# Patient Record
Sex: MALE | Race: WHITE | Employment: OTHER | URBAN - METROPOLITAN AREA
[De-identification: names, ages, dates, MRNs, and addresses within clinical notes are randomized per-mention and may not be internally consistent; named-entity substitution may affect disease eponyms.]

---

## 2018-02-12 ENCOUNTER — HOSPITAL ENCOUNTER (OUTPATIENT)
Age: 78
Discharge: HOME | End: 2018-02-12
Payer: MEDICARE

## 2018-12-04 ENCOUNTER — ANESTHESIA EVENT (OUTPATIENT)
Dept: OPERATING ROOM | Age: 78
End: 2018-12-04
Payer: MEDICARE

## 2018-12-04 ENCOUNTER — ANESTHESIA (OUTPATIENT)
Dept: OPERATING ROOM | Age: 78
End: 2018-12-04
Payer: MEDICARE

## 2018-12-04 ENCOUNTER — HOSPITAL ENCOUNTER (OUTPATIENT)
Age: 78
Setting detail: OUTPATIENT SURGERY
Discharge: HOME | End: 2018-12-04
Attending: NEUROLOGICAL SURGERY | Admitting: NEUROLOGICAL SURGERY
Payer: MEDICARE

## 2018-12-04 VITALS
SYSTOLIC BLOOD PRESSURE: 127 MMHG | DIASTOLIC BLOOD PRESSURE: 64 MMHG | RESPIRATION RATE: 16 BRPM | HEIGHT: 72 IN | BODY MASS INDEX: 28.58 KG/M2 | OXYGEN SATURATION: 95 % | TEMPERATURE: 97.3 F | WEIGHT: 211 LBS | HEART RATE: 68 BPM

## 2018-12-04 VITALS — DIASTOLIC BLOOD PRESSURE: 63 MMHG | OXYGEN SATURATION: 96 % | SYSTOLIC BLOOD PRESSURE: 124 MMHG

## 2018-12-04 DIAGNOSIS — G89.18 POST-OP PAIN: Primary | ICD-10-CM

## 2018-12-04 PROCEDURE — 3700000000 HC ANESTHESIA ATTENDED CARE: Performed by: NEUROLOGICAL SURGERY

## 2018-12-04 PROCEDURE — 6370000000 HC RX 637 (ALT 250 FOR IP): Performed by: NEUROLOGICAL SURGERY

## 2018-12-04 PROCEDURE — 2500000003 HC RX 250 WO HCPCS: Performed by: NEUROLOGICAL SURGERY

## 2018-12-04 PROCEDURE — 2580000003 HC RX 258: Performed by: NEUROLOGICAL SURGERY

## 2018-12-04 PROCEDURE — 7100000011 HC PHASE II RECOVERY - ADDTL 15 MIN: Performed by: NEUROLOGICAL SURGERY

## 2018-12-04 PROCEDURE — 3600000003 HC SURGERY LEVEL 3 BASE: Performed by: NEUROLOGICAL SURGERY

## 2018-12-04 PROCEDURE — 2500000003 HC RX 250 WO HCPCS: Performed by: ANESTHESIOLOGY

## 2018-12-04 PROCEDURE — 6360000002 HC RX W HCPCS: Performed by: NEUROLOGICAL SURGERY

## 2018-12-04 PROCEDURE — 2709999900 HC NON-CHARGEABLE SUPPLY: Performed by: NEUROLOGICAL SURGERY

## 2018-12-04 PROCEDURE — 2580000003 HC RX 258: Performed by: ANESTHESIOLOGY

## 2018-12-04 PROCEDURE — 6360000002 HC RX W HCPCS: Performed by: NURSE ANESTHETIST, CERTIFIED REGISTERED

## 2018-12-04 PROCEDURE — 3700000001 HC ADD 15 MINUTES (ANESTHESIA): Performed by: NEUROLOGICAL SURGERY

## 2018-12-04 PROCEDURE — 3600000013 HC SURGERY LEVEL 3 ADDTL 15MIN: Performed by: NEUROLOGICAL SURGERY

## 2018-12-04 PROCEDURE — 2500000003 HC RX 250 WO HCPCS: Performed by: NURSE ANESTHETIST, CERTIFIED REGISTERED

## 2018-12-04 PROCEDURE — 7100000010 HC PHASE II RECOVERY - FIRST 15 MIN: Performed by: NEUROLOGICAL SURGERY

## 2018-12-04 RX ORDER — CEFUROXIME AXETIL 500 MG/1
500 TABLET ORAL 2 TIMES DAILY
Refills: 0 | COMMUNITY
Start: 2018-11-12 | End: 2019-01-07

## 2018-12-04 RX ORDER — ROSUVASTATIN CALCIUM 20 MG/1
20 TABLET, COATED ORAL DAILY
COMMUNITY

## 2018-12-04 RX ORDER — LOSARTAN POTASSIUM 100 MG/1
50 TABLET ORAL DAILY
COMMUNITY
Start: 2016-03-15

## 2018-12-04 RX ORDER — DIPHENHYDRAMINE HYDROCHLORIDE 50 MG/ML
12.5 INJECTION INTRAMUSCULAR; INTRAVENOUS
Status: DISCONTINUED | OUTPATIENT
Start: 2018-12-04 | End: 2018-12-04 | Stop reason: HOSPADM

## 2018-12-04 RX ORDER — HYDROCODONE BITARTRATE AND ACETAMINOPHEN 5; 325 MG/1; MG/1
2 TABLET ORAL PRN
Status: DISCONTINUED | OUTPATIENT
Start: 2018-12-04 | End: 2018-12-04 | Stop reason: HOSPADM

## 2018-12-04 RX ORDER — TRAMADOL HYDROCHLORIDE 50 MG/1
50 TABLET ORAL 2 TIMES DAILY PRN
Status: ON HOLD | COMMUNITY
End: 2018-12-04 | Stop reason: HOSPADM

## 2018-12-04 RX ORDER — ONDANSETRON 2 MG/ML
4 INJECTION INTRAMUSCULAR; INTRAVENOUS
Status: DISCONTINUED | OUTPATIENT
Start: 2018-12-04 | End: 2018-12-04 | Stop reason: HOSPADM

## 2018-12-04 RX ORDER — GINSENG 100 MG
CAPSULE ORAL PRN
Status: DISCONTINUED | OUTPATIENT
Start: 2018-12-04 | End: 2018-12-04 | Stop reason: HOSPADM

## 2018-12-04 RX ORDER — LIDOCAINE HYDROCHLORIDE 20 MG/ML
INJECTION, SOLUTION INFILTRATION; PERINEURAL PRN
Status: DISCONTINUED | OUTPATIENT
Start: 2018-12-04 | End: 2018-12-04 | Stop reason: SDUPTHER

## 2018-12-04 RX ORDER — HYDROCODONE BITARTRATE AND ACETAMINOPHEN 5; 325 MG/1; MG/1
1 TABLET ORAL PRN
Status: DISCONTINUED | OUTPATIENT
Start: 2018-12-04 | End: 2018-12-04 | Stop reason: HOSPADM

## 2018-12-04 RX ORDER — SODIUM CHLORIDE, SODIUM LACTATE, POTASSIUM CHLORIDE, CALCIUM CHLORIDE 600; 310; 30; 20 MG/100ML; MG/100ML; MG/100ML; MG/100ML
INJECTION, SOLUTION INTRAVENOUS CONTINUOUS
Status: DISCONTINUED | OUTPATIENT
Start: 2018-12-04 | End: 2018-12-04 | Stop reason: HOSPADM

## 2018-12-04 RX ORDER — CEFAZOLIN SODIUM 2 G/50ML
2 SOLUTION INTRAVENOUS
Status: COMPLETED | OUTPATIENT
Start: 2018-12-04 | End: 2018-12-04

## 2018-12-04 RX ORDER — LIDOCAINE HYDROCHLORIDE 10 MG/ML
INJECTION, SOLUTION EPIDURAL; INFILTRATION; INTRACAUDAL; PERINEURAL PRN
Status: DISCONTINUED | OUTPATIENT
Start: 2018-12-04 | End: 2018-12-04 | Stop reason: HOSPADM

## 2018-12-04 RX ORDER — LIDOCAINE HYDROCHLORIDE 10 MG/ML
1 INJECTION, SOLUTION EPIDURAL; INFILTRATION; INTRACAUDAL; PERINEURAL
Status: COMPLETED | OUTPATIENT
Start: 2018-12-04 | End: 2018-12-04

## 2018-12-04 RX ORDER — PROPOFOL 10 MG/ML
INJECTION, EMULSION INTRAVENOUS CONTINUOUS PRN
Status: DISCONTINUED | OUTPATIENT
Start: 2018-12-04 | End: 2018-12-04 | Stop reason: SDUPTHER

## 2018-12-04 RX ORDER — FENTANYL CITRATE 50 UG/ML
50 INJECTION, SOLUTION INTRAMUSCULAR; INTRAVENOUS EVERY 10 MIN PRN
Status: DISCONTINUED | OUTPATIENT
Start: 2018-12-04 | End: 2018-12-04 | Stop reason: HOSPADM

## 2018-12-04 RX ORDER — MEPERIDINE HYDROCHLORIDE 25 MG/ML
12.5 INJECTION INTRAMUSCULAR; INTRAVENOUS; SUBCUTANEOUS EVERY 5 MIN PRN
Status: DISCONTINUED | OUTPATIENT
Start: 2018-12-04 | End: 2018-12-04 | Stop reason: HOSPADM

## 2018-12-04 RX ORDER — METOCLOPRAMIDE HYDROCHLORIDE 5 MG/ML
10 INJECTION INTRAMUSCULAR; INTRAVENOUS
Status: DISCONTINUED | OUTPATIENT
Start: 2018-12-04 | End: 2018-12-04 | Stop reason: HOSPADM

## 2018-12-04 RX ORDER — MAGNESIUM HYDROXIDE 1200 MG/15ML
LIQUID ORAL CONTINUOUS PRN
Status: DISCONTINUED | OUTPATIENT
Start: 2018-12-04 | End: 2018-12-04 | Stop reason: HOSPADM

## 2018-12-04 RX ORDER — PROPOFOL 10 MG/ML
INJECTION, EMULSION INTRAVENOUS PRN
Status: DISCONTINUED | OUTPATIENT
Start: 2018-12-04 | End: 2018-12-04 | Stop reason: SDUPTHER

## 2018-12-04 RX ORDER — CEPHALEXIN 500 MG/1
500 CAPSULE ORAL 3 TIMES DAILY
Qty: 21 CAPSULE | Refills: 0 | Status: SHIPPED | OUTPATIENT
Start: 2018-12-04 | End: 2018-12-11

## 2018-12-04 RX ORDER — OXYCODONE HYDROCHLORIDE AND ACETAMINOPHEN 5; 325 MG/1; MG/1
1 TABLET ORAL EVERY 6 HOURS PRN
Qty: 40 TABLET | Refills: 0 | Status: SHIPPED | OUTPATIENT
Start: 2018-12-04 | End: 2018-12-18

## 2018-12-04 RX ADMIN — LIDOCAINE HYDROCHLORIDE 0.1 ML: 10 INJECTION, SOLUTION EPIDURAL; INFILTRATION; INTRACAUDAL; PERINEURAL at 12:39

## 2018-12-04 RX ADMIN — SODIUM CHLORIDE, POTASSIUM CHLORIDE, SODIUM LACTATE AND CALCIUM CHLORIDE: 600; 310; 30; 20 INJECTION, SOLUTION INTRAVENOUS at 12:40

## 2018-12-04 RX ADMIN — CEFAZOLIN SODIUM 2 G: 2 SOLUTION INTRAVENOUS at 14:45

## 2018-12-04 RX ADMIN — PROPOFOL 140 MCG/KG/MIN: 10 INJECTION, EMULSION INTRAVENOUS at 14:42

## 2018-12-04 RX ADMIN — PROPOFOL 50 MG: 10 INJECTION, EMULSION INTRAVENOUS at 14:45

## 2018-12-04 RX ADMIN — PROPOFOL 50 MG: 10 INJECTION, EMULSION INTRAVENOUS at 14:42

## 2018-12-04 RX ADMIN — LIDOCAINE HYDROCHLORIDE 60 MG: 20 INJECTION, SOLUTION INFILTRATION; PERINEURAL at 14:42

## 2018-12-04 ASSESSMENT — PULMONARY FUNCTION TESTS
PIF_VALUE: 0

## 2018-12-04 ASSESSMENT — PAIN - FUNCTIONAL ASSESSMENT: PAIN_FUNCTIONAL_ASSESSMENT: 0-10

## 2019-01-07 ENCOUNTER — HOSPITAL ENCOUNTER (OUTPATIENT)
Dept: PREADMISSION TESTING | Age: 79
Discharge: HOME | End: 2019-01-11
Payer: MEDICARE

## 2019-01-07 VITALS
RESPIRATION RATE: 16 BRPM | HEART RATE: 80 BPM | WEIGHT: 217.4 LBS | DIASTOLIC BLOOD PRESSURE: 79 MMHG | SYSTOLIC BLOOD PRESSURE: 129 MMHG | HEIGHT: 72 IN | TEMPERATURE: 97.6 F | OXYGEN SATURATION: 98 % | BODY MASS INDEX: 29.45 KG/M2

## 2019-01-07 DIAGNOSIS — G56.02 CARPAL TUNNEL SYNDROME ON LEFT: ICD-10-CM

## 2019-01-07 DIAGNOSIS — Z92.3 HX OF RADIATION THERAPY: Chronic | ICD-10-CM

## 2019-01-07 DIAGNOSIS — I25.2 MI, OLD: Chronic | ICD-10-CM

## 2019-01-07 PROBLEM — I10 HTN (HYPERTENSION): Chronic | Status: ACTIVE | Noted: 2019-01-07

## 2019-01-07 PROBLEM — A41.9 SEPSIS (HCC): Chronic | Status: ACTIVE | Noted: 2019-01-07

## 2019-01-07 PROBLEM — N40.0 BPH (BENIGN PROSTATIC HYPERPLASIA): Chronic | Status: ACTIVE | Noted: 2019-01-07

## 2019-01-07 LAB
ANION GAP SERPL CALCULATED.3IONS-SCNC: 14 MEQ/L (ref 7–13)
BUN BLDV-MCNC: 25 MG/DL (ref 8–23)
CALCIUM SERPL-MCNC: 10.4 MG/DL (ref 8.6–10.2)
CHLORIDE BLD-SCNC: 105 MEQ/L (ref 98–107)
CO2: 26 MEQ/L (ref 22–29)
CREAT SERPL-MCNC: 1.15 MG/DL (ref 0.7–1.2)
EKG ATRIAL RATE: 73 BPM
EKG P AXIS: 1 DEGREES
EKG P-R INTERVAL: 278 MS
EKG Q-T INTERVAL: 416 MS
EKG QRS DURATION: 104 MS
EKG QTC CALCULATION (BAZETT): 458 MS
EKG R AXIS: 19 DEGREES
EKG T AXIS: 26 DEGREES
EKG VENTRICULAR RATE: 73 BPM
GFR AFRICAN AMERICAN: >60
GFR NON-AFRICAN AMERICAN: >60
GLUCOSE BLD-MCNC: 95 MG/DL (ref 74–109)
HCT VFR BLD CALC: 43.2 % (ref 42–52)
HEMOGLOBIN: 15.1 G/DL (ref 14–18)
INR BLD: 1
MCH RBC QN AUTO: 32.7 PG (ref 27–31.3)
MCHC RBC AUTO-ENTMCNC: 34.9 % (ref 33–37)
MCV RBC AUTO: 93.6 FL (ref 80–100)
PDW BLD-RTO: 13.8 % (ref 11.5–14.5)
PLATELET # BLD: 188 K/UL (ref 130–400)
POTASSIUM SERPL-SCNC: 4.1 MEQ/L (ref 3.5–5.1)
PROTHROMBIN TIME: 10 SEC (ref 9–11.5)
RBC # BLD: 4.61 M/UL (ref 4.7–6.1)
SODIUM BLD-SCNC: 145 MEQ/L (ref 132–144)
TSH SERPL DL<=0.05 MIU/L-ACNC: 0.18 UIU/ML (ref 0.27–4.2)
WBC # BLD: 8.3 K/UL (ref 4.8–10.8)

## 2019-01-07 PROCEDURE — 93010 ELECTROCARDIOGRAM REPORT: CPT | Performed by: INTERNAL MEDICINE

## 2019-01-07 PROCEDURE — 80048 BASIC METABOLIC PNL TOTAL CA: CPT

## 2019-01-07 PROCEDURE — 85027 COMPLETE CBC AUTOMATED: CPT

## 2019-01-07 PROCEDURE — 93005 ELECTROCARDIOGRAM TRACING: CPT

## 2019-01-07 PROCEDURE — 85610 PROTHROMBIN TIME: CPT

## 2019-01-07 PROCEDURE — 84443 ASSAY THYROID STIM HORMONE: CPT

## 2019-01-07 RX ORDER — SODIUM CHLORIDE 0.9 % (FLUSH) 0.9 %
10 SYRINGE (ML) INJECTION PRN
Status: CANCELLED | OUTPATIENT
Start: 2019-01-10

## 2019-01-07 RX ORDER — HYDROCHLOROTHIAZIDE 12.5 MG/1
12.5 CAPSULE, GELATIN COATED ORAL DAILY
COMMUNITY

## 2019-01-07 RX ORDER — SODIUM CHLORIDE 0.9 % (FLUSH) 0.9 %
10 SYRINGE (ML) INJECTION EVERY 12 HOURS SCHEDULED
Status: CANCELLED | OUTPATIENT
Start: 2019-01-10

## 2019-01-07 RX ORDER — CEFAZOLIN SODIUM 2 G/50ML
2 SOLUTION INTRAVENOUS ONCE
Status: CANCELLED | OUTPATIENT
Start: 2019-01-10

## 2019-01-07 RX ORDER — LIDOCAINE HYDROCHLORIDE 10 MG/ML
1 INJECTION, SOLUTION EPIDURAL; INFILTRATION; INTRACAUDAL; PERINEURAL
Status: CANCELLED | OUTPATIENT
Start: 2019-01-10 | End: 2019-01-10

## 2019-01-07 RX ORDER — SODIUM CHLORIDE, SODIUM LACTATE, POTASSIUM CHLORIDE, CALCIUM CHLORIDE 600; 310; 30; 20 MG/100ML; MG/100ML; MG/100ML; MG/100ML
INJECTION, SOLUTION INTRAVENOUS CONTINUOUS
Status: CANCELLED | OUTPATIENT
Start: 2019-01-10

## 2019-01-07 ASSESSMENT — ENCOUNTER SYMPTOMS
COUGH: 0
STRIDOR: 0
CONSTIPATION: 0
ABDOMINAL PAIN: 0
CHEST TIGHTNESS: 0
WHEEZING: 0
DIARRHEA: 0
SHORTNESS OF BREATH: 0
TROUBLE SWALLOWING: 0
EYES NEGATIVE: 1
NAUSEA: 0
BACK PAIN: 1
VOMITING: 0
ALLERGIC/IMMUNOLOGIC NEGATIVE: 1
SORE THROAT: 0

## 2019-01-10 ENCOUNTER — ANESTHESIA EVENT (OUTPATIENT)
Dept: OPERATING ROOM | Age: 79
End: 2019-01-10
Payer: MEDICARE

## 2019-01-10 ENCOUNTER — ANESTHESIA (OUTPATIENT)
Dept: OPERATING ROOM | Age: 79
End: 2019-01-10
Payer: MEDICARE

## 2019-01-10 ENCOUNTER — HOSPITAL ENCOUNTER (OUTPATIENT)
Age: 79
Setting detail: OUTPATIENT SURGERY
Discharge: HOME | End: 2019-01-10
Attending: NEUROLOGICAL SURGERY | Admitting: NEUROLOGICAL SURGERY
Payer: MEDICARE

## 2019-01-10 VITALS
WEIGHT: 217 LBS | OXYGEN SATURATION: 98 % | DIASTOLIC BLOOD PRESSURE: 68 MMHG | HEIGHT: 72 IN | TEMPERATURE: 97.5 F | SYSTOLIC BLOOD PRESSURE: 155 MMHG | BODY MASS INDEX: 29.39 KG/M2 | HEART RATE: 59 BPM | RESPIRATION RATE: 16 BRPM

## 2019-01-10 VITALS — SYSTOLIC BLOOD PRESSURE: 111 MMHG | DIASTOLIC BLOOD PRESSURE: 55 MMHG | OXYGEN SATURATION: 97 %

## 2019-01-10 DIAGNOSIS — G89.18 POST-OP PAIN: Primary | ICD-10-CM

## 2019-01-10 PROCEDURE — 2709999900 HC NON-CHARGEABLE SUPPLY: Performed by: NEUROLOGICAL SURGERY

## 2019-01-10 PROCEDURE — 2500000003 HC RX 250 WO HCPCS: Performed by: NURSE ANESTHETIST, CERTIFIED REGISTERED

## 2019-01-10 PROCEDURE — 2580000003 HC RX 258: Performed by: NURSE PRACTITIONER

## 2019-01-10 PROCEDURE — 3700000000 HC ANESTHESIA ATTENDED CARE: Performed by: NEUROLOGICAL SURGERY

## 2019-01-10 PROCEDURE — 7100000011 HC PHASE II RECOVERY - ADDTL 15 MIN: Performed by: NEUROLOGICAL SURGERY

## 2019-01-10 PROCEDURE — 2500000003 HC RX 250 WO HCPCS: Performed by: NEUROLOGICAL SURGERY

## 2019-01-10 PROCEDURE — 2500000003 HC RX 250 WO HCPCS: Performed by: NURSE PRACTITIONER

## 2019-01-10 PROCEDURE — 7100000010 HC PHASE II RECOVERY - FIRST 15 MIN: Performed by: NEUROLOGICAL SURGERY

## 2019-01-10 PROCEDURE — 6360000002 HC RX W HCPCS: Performed by: NURSE ANESTHETIST, CERTIFIED REGISTERED

## 2019-01-10 PROCEDURE — 6370000000 HC RX 637 (ALT 250 FOR IP): Performed by: NEUROLOGICAL SURGERY

## 2019-01-10 PROCEDURE — 2580000003 HC RX 258: Performed by: NEUROLOGICAL SURGERY

## 2019-01-10 PROCEDURE — 6370000000 HC RX 637 (ALT 250 FOR IP): Performed by: STUDENT IN AN ORGANIZED HEALTH CARE EDUCATION/TRAINING PROGRAM

## 2019-01-10 PROCEDURE — 6360000002 HC RX W HCPCS: Performed by: NURSE PRACTITIONER

## 2019-01-10 PROCEDURE — 3700000001 HC ADD 15 MINUTES (ANESTHESIA): Performed by: NEUROLOGICAL SURGERY

## 2019-01-10 PROCEDURE — 3600000013 HC SURGERY LEVEL 3 ADDTL 15MIN: Performed by: NEUROLOGICAL SURGERY

## 2019-01-10 PROCEDURE — 3600000003 HC SURGERY LEVEL 3 BASE: Performed by: NEUROLOGICAL SURGERY

## 2019-01-10 RX ORDER — CEFAZOLIN SODIUM 2 G/50ML
2 SOLUTION INTRAVENOUS ONCE
Status: COMPLETED | OUTPATIENT
Start: 2019-01-10 | End: 2019-01-10

## 2019-01-10 RX ORDER — PROPOFOL 10 MG/ML
INJECTION, EMULSION INTRAVENOUS PRN
Status: DISCONTINUED | OUTPATIENT
Start: 2019-01-10 | End: 2019-01-10 | Stop reason: SDUPTHER

## 2019-01-10 RX ORDER — SODIUM CHLORIDE, SODIUM LACTATE, POTASSIUM CHLORIDE, CALCIUM CHLORIDE 600; 310; 30; 20 MG/100ML; MG/100ML; MG/100ML; MG/100ML
INJECTION, SOLUTION INTRAVENOUS CONTINUOUS
Status: DISCONTINUED | OUTPATIENT
Start: 2019-01-10 | End: 2019-01-10 | Stop reason: HOSPADM

## 2019-01-10 RX ORDER — DIPHENHYDRAMINE HYDROCHLORIDE 50 MG/ML
12.5 INJECTION INTRAMUSCULAR; INTRAVENOUS
Status: DISCONTINUED | OUTPATIENT
Start: 2019-01-10 | End: 2019-01-10 | Stop reason: HOSPADM

## 2019-01-10 RX ORDER — HYDROCODONE BITARTRATE AND ACETAMINOPHEN 5; 325 MG/1; MG/1
2 TABLET ORAL PRN
Status: COMPLETED | OUTPATIENT
Start: 2019-01-10 | End: 2019-01-10

## 2019-01-10 RX ORDER — FENTANYL CITRATE 50 UG/ML
50 INJECTION, SOLUTION INTRAMUSCULAR; INTRAVENOUS EVERY 10 MIN PRN
Status: DISCONTINUED | OUTPATIENT
Start: 2019-01-10 | End: 2019-01-10 | Stop reason: HOSPADM

## 2019-01-10 RX ORDER — LIDOCAINE HYDROCHLORIDE 20 MG/ML
INJECTION, SOLUTION INFILTRATION; PERINEURAL PRN
Status: DISCONTINUED | OUTPATIENT
Start: 2019-01-10 | End: 2019-01-10 | Stop reason: SDUPTHER

## 2019-01-10 RX ORDER — GINSENG 100 MG
CAPSULE ORAL PRN
Status: DISCONTINUED | OUTPATIENT
Start: 2019-01-10 | End: 2019-01-10 | Stop reason: HOSPADM

## 2019-01-10 RX ORDER — MAGNESIUM HYDROXIDE 1200 MG/15ML
LIQUID ORAL CONTINUOUS PRN
Status: DISCONTINUED | OUTPATIENT
Start: 2019-01-10 | End: 2019-01-10 | Stop reason: HOSPADM

## 2019-01-10 RX ORDER — OXYCODONE HYDROCHLORIDE AND ACETAMINOPHEN 5; 325 MG/1; MG/1
1 TABLET ORAL EVERY 6 HOURS PRN
Qty: 28 TABLET | Refills: 0 | Status: SHIPPED | OUTPATIENT
Start: 2019-01-10 | End: 2019-01-17

## 2019-01-10 RX ORDER — ONDANSETRON 2 MG/ML
4 INJECTION INTRAMUSCULAR; INTRAVENOUS
Status: DISCONTINUED | OUTPATIENT
Start: 2019-01-10 | End: 2019-01-10 | Stop reason: HOSPADM

## 2019-01-10 RX ORDER — LIDOCAINE HYDROCHLORIDE 10 MG/ML
1 INJECTION, SOLUTION EPIDURAL; INFILTRATION; INTRACAUDAL; PERINEURAL
Status: COMPLETED | OUTPATIENT
Start: 2019-01-10 | End: 2019-01-10

## 2019-01-10 RX ORDER — CEPHALEXIN 500 MG/1
500 CAPSULE ORAL 3 TIMES DAILY
Qty: 21 CAPSULE | Refills: 0 | Status: SHIPPED | OUTPATIENT
Start: 2019-01-10 | End: 2019-01-17

## 2019-01-10 RX ORDER — MEPERIDINE HYDROCHLORIDE 25 MG/ML
12.5 INJECTION INTRAMUSCULAR; INTRAVENOUS; SUBCUTANEOUS EVERY 5 MIN PRN
Status: DISCONTINUED | OUTPATIENT
Start: 2019-01-10 | End: 2019-01-10 | Stop reason: HOSPADM

## 2019-01-10 RX ORDER — HYDROCODONE BITARTRATE AND ACETAMINOPHEN 5; 325 MG/1; MG/1
1 TABLET ORAL PRN
Status: COMPLETED | OUTPATIENT
Start: 2019-01-10 | End: 2019-01-10

## 2019-01-10 RX ORDER — METOCLOPRAMIDE HYDROCHLORIDE 5 MG/ML
10 INJECTION INTRAMUSCULAR; INTRAVENOUS
Status: DISCONTINUED | OUTPATIENT
Start: 2019-01-10 | End: 2019-01-10 | Stop reason: HOSPADM

## 2019-01-10 RX ORDER — LIDOCAINE HYDROCHLORIDE 10 MG/ML
INJECTION, SOLUTION EPIDURAL; INFILTRATION; INTRACAUDAL; PERINEURAL PRN
Status: DISCONTINUED | OUTPATIENT
Start: 2019-01-10 | End: 2019-01-10 | Stop reason: HOSPADM

## 2019-01-10 RX ADMIN — PROPOFOL 50 MG: 10 INJECTION, EMULSION INTRAVENOUS at 13:17

## 2019-01-10 RX ADMIN — PROPOFOL 50 MG: 10 INJECTION, EMULSION INTRAVENOUS at 13:13

## 2019-01-10 RX ADMIN — CEFAZOLIN SODIUM 2 G: 2 SOLUTION INTRAVENOUS at 13:06

## 2019-01-10 RX ADMIN — LIDOCAINE HYDROCHLORIDE 50 ML: 20 INJECTION, SOLUTION INFILTRATION; PERINEURAL at 13:10

## 2019-01-10 RX ADMIN — PROPOFOL 20 MG: 10 INJECTION, EMULSION INTRAVENOUS at 13:27

## 2019-01-10 RX ADMIN — PROPOFOL 30 MG: 10 INJECTION, EMULSION INTRAVENOUS at 13:31

## 2019-01-10 RX ADMIN — LIDOCAINE HYDROCHLORIDE 0.1 ML: 10 INJECTION, SOLUTION EPIDURAL; INFILTRATION; INTRACAUDAL; PERINEURAL at 11:46

## 2019-01-10 RX ADMIN — PROPOFOL 20 MG: 10 INJECTION, EMULSION INTRAVENOUS at 13:21

## 2019-01-10 RX ADMIN — HYDROCODONE BITARTRATE AND ACETAMINOPHEN 1 TABLET: 5; 325 TABLET ORAL at 14:13

## 2019-01-10 RX ADMIN — PROPOFOL 30 MG: 10 INJECTION, EMULSION INTRAVENOUS at 13:24

## 2019-01-10 RX ADMIN — PROPOFOL 100 MG: 10 INJECTION, EMULSION INTRAVENOUS at 13:10

## 2019-01-10 RX ADMIN — SODIUM CHLORIDE, POTASSIUM CHLORIDE, SODIUM LACTATE AND CALCIUM CHLORIDE: 600; 310; 30; 20 INJECTION, SOLUTION INTRAVENOUS at 11:47

## 2019-01-10 ASSESSMENT — PULMONARY FUNCTION TESTS
PIF_VALUE: 1
PIF_VALUE: 0
PIF_VALUE: 0
PIF_VALUE: 1
PIF_VALUE: 0
PIF_VALUE: 0
PIF_VALUE: 1
PIF_VALUE: 1
PIF_VALUE: 0
PIF_VALUE: 1
PIF_VALUE: 0
PIF_VALUE: 0
PIF_VALUE: 1
PIF_VALUE: 0
PIF_VALUE: 1
PIF_VALUE: 0
PIF_VALUE: 0
PIF_VALUE: 1
PIF_VALUE: 0
PIF_VALUE: 1
PIF_VALUE: 1

## 2019-01-10 ASSESSMENT — PAIN - FUNCTIONAL ASSESSMENT: PAIN_FUNCTIONAL_ASSESSMENT: 0-10

## 2019-01-10 ASSESSMENT — PAIN SCALES - GENERAL: PAINLEVEL_OUTOF10: 8

## 2023-12-13 ENCOUNTER — TELEPHONE (OUTPATIENT)
Dept: CARDIOLOGY | Facility: CLINIC | Age: 83
End: 2023-12-13
Payer: MEDICARE

## 2023-12-13 DIAGNOSIS — Z79.899 HIGH RISK MEDICATION USE: ICD-10-CM

## 2023-12-13 DIAGNOSIS — I48.91 ATRIAL FIBRILLATION, UNSPECIFIED TYPE (MULTI): ICD-10-CM

## 2023-12-13 NOTE — TELEPHONE ENCOUNTER
Amiodarone testing orders sent to [facility] due in November.  PFT paper order also completed.

## 2023-12-29 PROBLEM — I43 CARDIAC AMYLOIDOSIS (MULTI): Status: ACTIVE | Noted: 2023-12-29

## 2023-12-29 PROBLEM — I10 ESSENTIAL HYPERTENSION: Status: ACTIVE | Noted: 2023-12-29

## 2023-12-29 PROBLEM — K80.20 GALL STONES: Status: ACTIVE | Noted: 2023-12-29

## 2023-12-29 PROBLEM — I26.99 PULMONARY EMBOLISM (MULTI): Status: ACTIVE | Noted: 2023-12-29

## 2023-12-29 PROBLEM — N40.0 BPH (BENIGN PROSTATIC HYPERPLASIA): Status: ACTIVE | Noted: 2023-12-29

## 2023-12-29 PROBLEM — R94.31 ABNORMAL EKG: Status: ACTIVE | Noted: 2023-12-29

## 2023-12-29 PROBLEM — E78.5 HYPERLIPIDEMIA: Status: ACTIVE | Noted: 2023-12-29

## 2023-12-29 PROBLEM — I48.91 ATRIAL FIBRILLATION (MULTI): Status: ACTIVE | Noted: 2023-12-29

## 2023-12-29 PROBLEM — I21.9 MYOCARDIAL INFARCTION (MULTI): Status: ACTIVE | Noted: 2023-12-29

## 2023-12-29 PROBLEM — E85.4 CARDIAC AMYLOIDOSIS (MULTI): Status: ACTIVE | Noted: 2023-12-29

## 2023-12-29 PROBLEM — I25.10 CORONARY ARTERY DISEASE: Status: ACTIVE | Noted: 2023-12-29

## 2023-12-29 PROBLEM — M25.473 ANKLE EDEMA: Status: ACTIVE | Noted: 2023-12-29

## 2023-12-29 PROBLEM — I50.30: Status: ACTIVE | Noted: 2023-12-29

## 2023-12-29 PROBLEM — E04.2 MULTINODULAR GOITER: Status: ACTIVE | Noted: 2023-12-29

## 2023-12-29 PROBLEM — R06.09 DYSPNEA ON EXERTION: Status: ACTIVE | Noted: 2023-12-29

## 2023-12-29 RX ORDER — FUROSEMIDE 20 MG/1
20 TABLET ORAL DAILY
COMMUNITY
Start: 2022-02-11

## 2023-12-29 RX ORDER — AMIODARONE HYDROCHLORIDE 200 MG/1
200 TABLET ORAL DAILY
COMMUNITY
Start: 2021-12-03 | End: 2024-02-16

## 2023-12-29 RX ORDER — ROSUVASTATIN CALCIUM 20 MG/1
20 TABLET, COATED ORAL NIGHTLY
COMMUNITY
Start: 2020-11-16

## 2023-12-29 RX ORDER — TRAMADOL HYDROCHLORIDE 50 MG/1
50 TABLET ORAL AS NEEDED
COMMUNITY
Start: 2021-01-27 | End: 2024-01-24 | Stop reason: ALTCHOICE

## 2023-12-29 RX ORDER — APIXABAN 5 MG/1
5 TABLET, FILM COATED ORAL 2 TIMES DAILY
COMMUNITY
Start: 2021-01-20

## 2023-12-29 RX ORDER — LEVOTHYROXINE SODIUM 150 UG/1
150 TABLET ORAL DAILY
COMMUNITY

## 2024-01-24 ENCOUNTER — OFFICE VISIT (OUTPATIENT)
Dept: CARDIOLOGY | Facility: CLINIC | Age: 84
End: 2024-01-24
Payer: MEDICARE

## 2024-01-24 VITALS
BODY MASS INDEX: 28.17 KG/M2 | HEART RATE: 71 BPM | HEIGHT: 72 IN | DIASTOLIC BLOOD PRESSURE: 60 MMHG | SYSTOLIC BLOOD PRESSURE: 124 MMHG | WEIGHT: 208 LBS

## 2024-01-24 DIAGNOSIS — Z78.9 NEVER SMOKED ANY SUBSTANCE: ICD-10-CM

## 2024-01-24 DIAGNOSIS — I48.0 PAROXYSMAL ATRIAL FIBRILLATION (MULTI): Primary | ICD-10-CM

## 2024-01-24 DIAGNOSIS — I25.10 CORONARY ARTERY DISEASE INVOLVING NATIVE CORONARY ARTERY OF NATIVE HEART WITHOUT ANGINA PECTORIS: ICD-10-CM

## 2024-01-24 DIAGNOSIS — E78.2 MIXED HYPERLIPIDEMIA: ICD-10-CM

## 2024-01-24 DIAGNOSIS — I10 ESSENTIAL HYPERTENSION: ICD-10-CM

## 2024-01-24 PROBLEM — I43 CARDIAC AMYLOIDOSIS (MULTI): Status: RESOLVED | Noted: 2023-12-29 | Resolved: 2024-01-24

## 2024-01-24 PROBLEM — E85.4 CARDIAC AMYLOIDOSIS (MULTI): Status: RESOLVED | Noted: 2023-12-29 | Resolved: 2024-01-24

## 2024-01-24 PROCEDURE — 93000 ELECTROCARDIOGRAM COMPLETE: CPT | Performed by: INTERNAL MEDICINE

## 2024-01-24 PROCEDURE — 3078F DIAST BP <80 MM HG: CPT | Performed by: INTERNAL MEDICINE

## 2024-01-24 PROCEDURE — 3074F SYST BP LT 130 MM HG: CPT | Performed by: INTERNAL MEDICINE

## 2024-01-24 PROCEDURE — 99214 OFFICE O/P EST MOD 30 MIN: CPT | Performed by: INTERNAL MEDICINE

## 2024-01-24 RX ORDER — LOSARTAN POTASSIUM 25 MG/1
25 TABLET ORAL DAILY
COMMUNITY

## 2024-01-24 NOTE — PROGRESS NOTES
Subjective   Kwabena Farley is a 83 y.o. male       Chief Complaint    Follow-up          HPI     Patient returns in follow-up of problems as noted.  In the interim he is done well.  He denies any angina or anginal cloven symptomatology.  He has rare paroxysms of atrial fibrillation.  We discussed them.  They occur about twice a week and last about an hour.  Minimal symptoms.  We discussed other options including higher dose amiodarone and/or ablation.  After discussing explaining and exploring it in great detail he chooses to continue as before without change or adjustment.    He was educated regarding other cardiac signs and symptoms to watch for.  He will call if they arise.  Review of risk factor management demonstrates good control.     EKG done in office today   Objective   Physical Exam  Constitutional:       Appearance: Normal appearance. He is normal weight.   HENT:      Nose: Nose normal.   Neck:      Vascular: No carotid bruit.   Cardiovascular:      Rate and Rhythm: Normal rate.      Pulses: Normal pulses.      Heart sounds: Normal heart sounds.   Pulmonary:      Effort: Pulmonary effort is normal.   Abdominal:      General: Bowel sounds are normal.      Palpations: Abdomen is soft.   Genitourinary:     Rectum: Normal.   Musculoskeletal:         General: Normal range of motion.      Cervical back: Normal range of motion.      Right lower leg: No edema.      Left lower leg: No edema.   Skin:     General: Skin is warm and dry.   Neurological:      General: No focal deficit present.      Mental Status: He is alert.   Psychiatric:         Mood and Affect: Mood normal.         Behavior: Behavior normal.         Thought Content: Thought content normal.         Judgment: Judgment normal.         Current Medications    Current Outpatient Medications:     amiodarone (Pacerone) 200 mg tablet, Take 1 tablet (200 mg) by mouth once daily., Disp: , Rfl:     Eliquis 5 mg tablet, Take 1 tablet (5 mg) by mouth 2 times  a day., Disp: , Rfl:     furosemide (Lasix) 20 mg tablet, Take 1 tablet (20 mg) by mouth once daily., Disp: , Rfl:     levothyroxine (Synthroid, Levoxyl) 150 mcg tablet, Take 1 tablet (150 mcg) by mouth once daily., Disp: , Rfl:     losartan (Cozaar) 25 mg tablet, Take 1 tablet (25 mg) by mouth once daily., Disp: , Rfl:     rosuvastatin (Crestor) 20 mg tablet, Take 1 tablet (20 mg) by mouth once daily at bedtime., Disp: , Rfl:                      Assessment/Plan   1. Paroxysmal atrial fibrillation (CMS/HCC)  Minimal paroxysms.  Minimally symptomatic.  Discussed other options and he wishes to continue as is    2. Mixed hyperlipidemia  Review of current treatment strategy demonstrates adequate treatment control.    3. Coronary artery disease involving native coronary artery of native heart without angina pectoris  No symptoms with paroxysms of atrial fibrillation hence we believe CAD to be stable.    4. Essential hypertension  Review of current treatment strategy demonstrates good control    5. Never smoked any substance  Noted      Scribe Attestation  By signing my name below, Pamela LANDRY LPN  , Scribe   attest that this documentation has been prepared under the direction and in the presence of Subhash Bell MD.

## 2024-01-24 NOTE — LETTER
January 24, 2024     Magdaleno Scott MD  [address]  [facility] [address]    Patient: Kwabena Farley   YOB: 1940   Date of Visit: 1/24/2024       Dear Dr. Magdaleno Scott MD:    Thank you for referring Kwabena Farley to me for evaluation. Below are my notes for this consultation.  If you have questions, please do not hesitate to call me. I look forward to following your patient along with you.       Sincerely,     Subhash Bell MD      CC: No Recipients  ______________________________________________________________________________________    Subjective   Kwabena Farley is a 83 y.o. male       Chief Complaint    Follow-up          HPI     Patient returns in follow-up of problems as noted.  In the interim he is done well.  He denies any angina or anginal cloven symptomatology.  He has rare paroxysms of atrial fibrillation.  We discussed them.  They occur about twice a week and last about an hour.  Minimal symptoms.  We discussed other options including higher dose amiodarone and/or ablation.  After discussing explaining and exploring it in great detail he chooses to continue as before without change or adjustment.    He was educated regarding other cardiac signs and symptoms to watch for.  He will call if they arise.  Review of risk factor management demonstrates good control.     EKG done in office today   Objective   Physical Exam  Constitutional:       Appearance: Normal appearance. He is normal weight.   HENT:      Nose: Nose normal.   Neck:      Vascular: No carotid bruit.   Cardiovascular:      Rate and Rhythm: Normal rate.      Pulses: Normal pulses.      Heart sounds: Normal heart sounds.   Pulmonary:      Effort: Pulmonary effort is normal.   Abdominal:      General: Bowel sounds are normal.      Palpations: Abdomen is soft.   Genitourinary:     Rectum: Normal.   Musculoskeletal:         General: Normal range of motion.      Cervical back: Normal range of motion.       Right lower leg: No edema.      Left lower leg: No edema.   Skin:     General: Skin is warm and dry.   Neurological:      General: No focal deficit present.      Mental Status: He is alert.   Psychiatric:         Mood and Affect: Mood normal.         Behavior: Behavior normal.         Thought Content: Thought content normal.         Judgment: Judgment normal.         Current Medications    Current Outpatient Medications:   •  amiodarone (Pacerone) 200 mg tablet, Take 1 tablet (200 mg) by mouth once daily., Disp: , Rfl:   •  Eliquis 5 mg tablet, Take 1 tablet (5 mg) by mouth 2 times a day., Disp: , Rfl:   •  furosemide (Lasix) 20 mg tablet, Take 1 tablet (20 mg) by mouth once daily., Disp: , Rfl:   •  levothyroxine (Synthroid, Levoxyl) 150 mcg tablet, Take 1 tablet (150 mcg) by mouth once daily., Disp: , Rfl:   •  losartan (Cozaar) 25 mg tablet, Take 1 tablet (25 mg) by mouth once daily., Disp: , Rfl:   •  rosuvastatin (Crestor) 20 mg tablet, Take 1 tablet (20 mg) by mouth once daily at bedtime., Disp: , Rfl:                      Assessment/Plan   1. Paroxysmal atrial fibrillation (CMS/HCC)  Minimal paroxysms.  Minimally symptomatic.  Discussed other options and he wishes to continue as is    2. Mixed hyperlipidemia  Review of current treatment strategy demonstrates adequate treatment control.    3. Coronary artery disease involving native coronary artery of native heart without angina pectoris  No symptoms with paroxysms of atrial fibrillation hence we believe CAD to be stable.    4. Essential hypertension  Review of current treatment strategy demonstrates good control    5. Never smoked any substance  Noted      Scribe Attestation  By signing my name below, Pamela LANDRY LPN  , Scribe   attest that this documentation has been prepared under the direction and in the presence of Subhash Bell MD.

## 2024-01-24 NOTE — PATIENT INSTRUCTIONS
Please bring all medicines, vitamins, and herbal supplements with you when you come to the office.    Prescriptions will not be filled unless you are compliant with your follow up appointments or have a follow up appointment scheduled as per instruction of your physician. Refills should be requested at the time of your visit.     Amiodarone follow up per routine

## 2024-02-15 DIAGNOSIS — I48.20 CHRONIC ATRIAL FIBRILLATION (MULTI): ICD-10-CM

## 2024-02-16 RX ORDER — AMIODARONE HYDROCHLORIDE 200 MG/1
200 TABLET ORAL DAILY
Qty: 90 TABLET | Refills: 1 | Status: SHIPPED | OUTPATIENT
Start: 2024-02-16

## 2024-06-22 DIAGNOSIS — I48.20 CHRONIC ATRIAL FIBRILLATION (MULTI): ICD-10-CM

## 2024-06-25 RX ORDER — AMIODARONE HYDROCHLORIDE 200 MG/1
200 TABLET ORAL DAILY
Qty: 90 TABLET | Refills: 3 | Status: SHIPPED | OUTPATIENT
Start: 2024-06-25

## 2024-07-12 ENCOUNTER — APPOINTMENT (OUTPATIENT)
Dept: CARDIOLOGY | Facility: CLINIC | Age: 84
End: 2024-07-12
Payer: MEDICARE

## 2024-07-12 VITALS
HEIGHT: 72 IN | DIASTOLIC BLOOD PRESSURE: 52 MMHG | HEART RATE: 70 BPM | BODY MASS INDEX: 27.9 KG/M2 | WEIGHT: 206 LBS | SYSTOLIC BLOOD PRESSURE: 106 MMHG

## 2024-07-12 DIAGNOSIS — I25.10 CORONARY ARTERY DISEASE INVOLVING NATIVE CORONARY ARTERY OF NATIVE HEART WITHOUT ANGINA PECTORIS: Primary | ICD-10-CM

## 2024-07-12 DIAGNOSIS — Z98.61 HISTORY OF PTCA: ICD-10-CM

## 2024-07-12 DIAGNOSIS — I48.0 PAROXYSMAL ATRIAL FIBRILLATION (MULTI): ICD-10-CM

## 2024-07-12 DIAGNOSIS — I10 ESSENTIAL HYPERTENSION: ICD-10-CM

## 2024-07-12 DIAGNOSIS — Z78.9 NEVER SMOKED ANY SUBSTANCE: ICD-10-CM

## 2024-07-12 DIAGNOSIS — E78.2 MIXED HYPERLIPIDEMIA: ICD-10-CM

## 2024-07-12 DIAGNOSIS — I48.20 CHRONIC ATRIAL FIBRILLATION (MULTI): ICD-10-CM

## 2024-07-12 PROCEDURE — 1159F MED LIST DOCD IN RCRD: CPT | Performed by: INTERNAL MEDICINE

## 2024-07-12 PROCEDURE — 3074F SYST BP LT 130 MM HG: CPT | Performed by: INTERNAL MEDICINE

## 2024-07-12 PROCEDURE — 1036F TOBACCO NON-USER: CPT | Performed by: INTERNAL MEDICINE

## 2024-07-12 PROCEDURE — 99214 OFFICE O/P EST MOD 30 MIN: CPT | Performed by: INTERNAL MEDICINE

## 2024-07-12 PROCEDURE — 3078F DIAST BP <80 MM HG: CPT | Performed by: INTERNAL MEDICINE

## 2024-07-12 PROCEDURE — 93000 ELECTROCARDIOGRAM COMPLETE: CPT | Performed by: INTERNAL MEDICINE

## 2024-07-12 RX ORDER — AMIODARONE HYDROCHLORIDE 200 MG/1
200 TABLET ORAL DAILY
Qty: 90 TABLET | Refills: 3 | Status: SHIPPED | OUTPATIENT
Start: 2024-07-12 | End: 2025-07-12

## 2024-07-12 NOTE — PATIENT INSTRUCTIONS
Please bring all medicines, vitamins, and herbal supplements with you when you come to the office.    Prescriptions will not be filled unless you are compliant with your follow up appointments or have a follow up appointment scheduled as per instruction of your physician. Refills should be requested at the time of your visit.     BMI was above normal measurement. Current weight: 93.4 kg (206 lb)  Weight change since last visit (-) denotes wt loss -2 lbs   Weight loss needed to achieve BMI 25: 22.1 Lbs  Weight loss needed to achieve BMI 30: -14.7 Lbs  Provided instructions on dietary changes  Provided instructions on exercise.    Amiodarone follow up per routine

## 2024-07-12 NOTE — LETTER
July 12, 2024     Magdaleno Scott MD  [address]  [facility] [address]    Patient: Efra Farley   YOB: 1940   Date of Visit: 7/12/2024       Dear Dr. Magdaleno Scott MD:    Thank you for referring Efra Farley to me for evaluation. Below are my notes for this consultation.  If you have questions, please do not hesitate to call me. I look forward to following your patient along with you.       Sincerely,     Subhash Bell MD      CC: No Recipients  ______________________________________________________________________________________    Subjective   Kwabena Farley is a 84 y.o. male       Chief Complaint    Follow-up          HPI     ROS     Patient returns in follow-up of problems as noted.  In the interim he is done well.  He has no angina CHF or arrhythmia symptomatology at the moment.  He has a breakthrough of atrial fibrillation about once a month.  It lasts about an hour.  Essentially asymptomatic.  He converted spontaneously.  We discussed treatment options and he wishes to continue on current dose of amiodarone I believe this to be reasonable.    Treatment of blood pressure and lipids is reviewed and felt to be acceptable.  He has none of the symptoms of coronary disease that preceded his original diagnosis and PTCA and because of this we believe he is doing well.  He is aerobically active albeit limited by low back pain.  BMI is noted slightly high in the merits of modest diet and weight loss were advocated.      Vitals:    07/12/24 1525   BP: 106/52   BP Location: Left arm   Patient Position: Sitting   Pulse: 70   Weight: 93.4 kg (206 lb)   Height: 1.829 m (6')    EKG done in office today      Objective   Physical Exam  Constitutional:       Appearance: Normal appearance.   HENT:      Nose: Nose normal.   Neck:      Vascular: No carotid bruit.   Cardiovascular:      Rate and Rhythm: Normal rate.      Pulses: Normal pulses.      Heart sounds: Normal heart sounds.    Pulmonary:      Effort: Pulmonary effort is normal.   Abdominal:      General: Bowel sounds are normal.      Palpations: Abdomen is soft.   Musculoskeletal:         General: Normal range of motion.      Cervical back: Normal range of motion.      Right lower leg: No edema.      Left lower leg: No edema.   Skin:     General: Skin is warm and dry.   Neurological:      General: No focal deficit present.      Mental Status: He is alert.   Psychiatric:         Mood and Affect: Mood normal.         Behavior: Behavior normal.         Thought Content: Thought content normal.         Judgment: Judgment normal.         Allergies  Patient has no known allergies.     Current Medications    Current Outpatient Medications:   •  amiodarone (Pacerone) 200 mg tablet, TAKE 1 TABLET BY MOUTH DAILY, Disp: 90 tablet, Rfl: 3  •  Eliquis 5 mg tablet, Take 1 tablet (5 mg) by mouth 2 times a day., Disp: , Rfl:   •  furosemide (Lasix) 20 mg tablet, Take 1 tablet (20 mg) by mouth once daily., Disp: , Rfl:   •  levothyroxine (Synthroid, Levoxyl) 150 mcg tablet, Take 1 tablet (150 mcg) by mouth once daily., Disp: , Rfl:   •  losartan (Cozaar) 25 mg tablet, Take 1 tablet (25 mg) by mouth once daily., Disp: , Rfl:   •  rosuvastatin (Crestor) 20 mg tablet, Take 1 tablet (20 mg) by mouth once daily at bedtime., Disp: , Rfl:                      Assessment/Plan     1. Paroxysmal atrial fibrillation (Multi)  Good control on amiodarone as above.  Continue same.  - Follow Up In Cardiology    2. Coronary artery disease involving native coronary artery of native heart without angina pectoris  No anginal symptomatology.  He remembers the symptoms that preceded diagnosis and has none.    3. Mixed hyperlipidemia  Treatment strategy reviewed and acceptable    4. Essential hypertension  Treatment strategy reviewed and acceptable    5. History of PTCA  Durable result has been achieved    6. Never smoked any substance  Noted        Scribe Attestation  By  signing my name below, I, Reanna Washington LPN   attest that this documentation has been prepared under the direction and in the presence of Subhash Bell MD.     Provider Attestation - Scribe documentation    All medical record entries made by the Scribe were at my direction and personally dictated by me. I have reviewed the chart and agree that the record accurately reflects my personal performance of the history, physical exam, discussion and plan.

## 2024-07-12 NOTE — PROGRESS NOTES
Subjective   Kwabena Farley is a 84 y.o. male       Chief Complaint    Follow-up          HPI     ROS     Patient returns in follow-up of problems as noted.  In the interim he is done well.  He has no angina CHF or arrhythmia symptomatology at the moment.  He has a breakthrough of atrial fibrillation about once a month.  It lasts about an hour.  Essentially asymptomatic.  He converted spontaneously.  We discussed treatment options and he wishes to continue on current dose of amiodarone I believe this to be reasonable.    Treatment of blood pressure and lipids is reviewed and felt to be acceptable.  He has none of the symptoms of coronary disease that preceded his original diagnosis and PTCA and because of this we believe he is doing well.  He is aerobically active albeit limited by low back pain.  BMI is noted slightly high in the merits of modest diet and weight loss were advocated.      Vitals:    07/12/24 1525   BP: 106/52   BP Location: Left arm   Patient Position: Sitting   Pulse: 70   Weight: 93.4 kg (206 lb)   Height: 1.829 m (6')    EKG done in office today      Objective   Physical Exam  Constitutional:       Appearance: Normal appearance.   HENT:      Nose: Nose normal.   Neck:      Vascular: No carotid bruit.   Cardiovascular:      Rate and Rhythm: Normal rate.      Pulses: Normal pulses.      Heart sounds: Normal heart sounds.   Pulmonary:      Effort: Pulmonary effort is normal.   Abdominal:      General: Bowel sounds are normal.      Palpations: Abdomen is soft.   Musculoskeletal:         General: Normal range of motion.      Cervical back: Normal range of motion.      Right lower leg: No edema.      Left lower leg: No edema.   Skin:     General: Skin is warm and dry.   Neurological:      General: No focal deficit present.      Mental Status: He is alert.   Psychiatric:         Mood and Affect: Mood normal.         Behavior: Behavior normal.         Thought Content: Thought content normal.          Judgment: Judgment normal.         Allergies  Patient has no known allergies.     Current Medications    Current Outpatient Medications:     amiodarone (Pacerone) 200 mg tablet, TAKE 1 TABLET BY MOUTH DAILY, Disp: 90 tablet, Rfl: 3    Eliquis 5 mg tablet, Take 1 tablet (5 mg) by mouth 2 times a day., Disp: , Rfl:     furosemide (Lasix) 20 mg tablet, Take 1 tablet (20 mg) by mouth once daily., Disp: , Rfl:     levothyroxine (Synthroid, Levoxyl) 150 mcg tablet, Take 1 tablet (150 mcg) by mouth once daily., Disp: , Rfl:     losartan (Cozaar) 25 mg tablet, Take 1 tablet (25 mg) by mouth once daily., Disp: , Rfl:     rosuvastatin (Crestor) 20 mg tablet, Take 1 tablet (20 mg) by mouth once daily at bedtime., Disp: , Rfl:                      Assessment/Plan     1. Paroxysmal atrial fibrillation (Multi)  Good control on amiodarone as above.  Continue same.  - Follow Up In Cardiology    2. Coronary artery disease involving native coronary artery of native heart without angina pectoris  No anginal symptomatology.  He remembers the symptoms that preceded diagnosis and has none.    3. Mixed hyperlipidemia  Treatment strategy reviewed and acceptable    4. Essential hypertension  Treatment strategy reviewed and acceptable    5. History of PTCA  Durable result has been achieved    6. Never smoked any substance  Noted        Scribe Attestation  By signing my name below, Pamela LANDRY LPN, [employer]   attest that this documentation has been prepared under the direction and in the presence of Subhash Bell MD.     Provider Attestation - Scribe documentation    All medical record entries made by the Scribe were at my direction and personally dictated by me. I have reviewed the chart and agree that the record accurately reflects my personal performance of the history, physical exam, discussion and plan.

## 2024-07-17 ENCOUNTER — TELEPHONE (OUTPATIENT)
Dept: CARDIOLOGY | Facility: CLINIC | Age: 84
End: 2024-07-17
Payer: MEDICARE

## 2025-02-07 ENCOUNTER — APPOINTMENT (OUTPATIENT)
Dept: CARDIOLOGY | Facility: CLINIC | Age: 85
End: 2025-02-07
Payer: MEDICARE

## 2025-02-07 ENCOUNTER — TELEPHONE (OUTPATIENT)
Dept: CARDIOLOGY | Facility: CLINIC | Age: 85
End: 2025-02-07

## 2025-02-07 VITALS
DIASTOLIC BLOOD PRESSURE: 60 MMHG | HEIGHT: 72 IN | HEART RATE: 69 BPM | SYSTOLIC BLOOD PRESSURE: 112 MMHG | BODY MASS INDEX: 28.47 KG/M2 | WEIGHT: 210.2 LBS

## 2025-02-07 DIAGNOSIS — Z01.810 PREOP CARDIOVASCULAR EXAM: Primary | ICD-10-CM

## 2025-02-07 DIAGNOSIS — I48.0 PAROXYSMAL ATRIAL FIBRILLATION (MULTI): ICD-10-CM

## 2025-02-07 DIAGNOSIS — I25.10 CORONARY ARTERY DISEASE INVOLVING NATIVE CORONARY ARTERY OF NATIVE HEART WITHOUT ANGINA PECTORIS: ICD-10-CM

## 2025-02-07 DIAGNOSIS — N18.32 STAGE 3B CHRONIC KIDNEY DISEASE (MULTI): ICD-10-CM

## 2025-02-07 DIAGNOSIS — I10 ESSENTIAL HYPERTENSION: ICD-10-CM

## 2025-02-07 DIAGNOSIS — I44.1 SECOND DEGREE AV BLOCK, MOBITZ TYPE I: ICD-10-CM

## 2025-02-07 DIAGNOSIS — Z98.61 HISTORY OF PTCA: ICD-10-CM

## 2025-02-07 DIAGNOSIS — Z78.9 NEVER SMOKED ANY SUBSTANCE: ICD-10-CM

## 2025-02-07 DIAGNOSIS — E55.9 VITAMIN D DEFICIENCY: ICD-10-CM

## 2025-02-07 DIAGNOSIS — E66.3 OVERWEIGHT: ICD-10-CM

## 2025-02-07 DIAGNOSIS — Z79.01 LONG TERM CURRENT USE OF ANTICOAGULANT THERAPY: ICD-10-CM

## 2025-02-07 DIAGNOSIS — E78.2 MIXED HYPERLIPIDEMIA: ICD-10-CM

## 2025-02-07 DIAGNOSIS — I26.99 PULMONARY EMBOLISM, UNSPECIFIED CHRONICITY, UNSPECIFIED PULMONARY EMBOLISM TYPE, UNSPECIFIED WHETHER ACUTE COR PULMONALE PRESENT (MULTI): ICD-10-CM

## 2025-02-07 DIAGNOSIS — I21.9 MYOCARDIAL INFARCTION, UNSPECIFIED MI TYPE, UNSPECIFIED ARTERY (MULTI): ICD-10-CM

## 2025-02-07 PROBLEM — Z95.828 GREENFIELD FILTER IN PLACE: Status: ACTIVE | Noted: 2025-02-07

## 2025-02-07 PROCEDURE — 93000 ELECTROCARDIOGRAM COMPLETE: CPT | Performed by: INTERNAL MEDICINE

## 2025-02-07 PROCEDURE — 3078F DIAST BP <80 MM HG: CPT | Performed by: INTERNAL MEDICINE

## 2025-02-07 PROCEDURE — 1036F TOBACCO NON-USER: CPT | Performed by: INTERNAL MEDICINE

## 2025-02-07 PROCEDURE — G2211 COMPLEX E/M VISIT ADD ON: HCPCS | Performed by: INTERNAL MEDICINE

## 2025-02-07 PROCEDURE — 99215 OFFICE O/P EST HI 40 MIN: CPT | Performed by: INTERNAL MEDICINE

## 2025-02-07 PROCEDURE — 1159F MED LIST DOCD IN RCRD: CPT | Performed by: INTERNAL MEDICINE

## 2025-02-07 PROCEDURE — 3074F SYST BP LT 130 MM HG: CPT | Performed by: INTERNAL MEDICINE

## 2025-02-07 RX ORDER — FUROSEMIDE 20 MG/1
20 TABLET ORAL AS NEEDED
Qty: 90 TABLET | Refills: 1 | Status: SHIPPED | OUTPATIENT
Start: 2025-02-07 | End: 2026-02-07

## 2025-02-07 RX ORDER — ASPIRIN 81 MG/1
81 TABLET ORAL 2 TIMES WEEKLY
Start: 2025-02-10 | End: 2026-02-10

## 2025-02-07 RX ORDER — ERGOCALCIFEROL 1.25 MG/1
1.25 CAPSULE ORAL WEEKLY
COMMUNITY

## 2025-02-07 RX ORDER — LOSARTAN POTASSIUM 25 MG/1
25 TABLET ORAL DAILY
Qty: 90 TABLET | Refills: 3 | Status: SHIPPED | OUTPATIENT
Start: 2025-02-07 | End: 2026-02-07

## 2025-02-07 ASSESSMENT — ENCOUNTER SYMPTOMS: SHORTNESS OF BREATH: 1

## 2025-02-07 NOTE — PROGRESS NOTES
Subjective   Kwabena Farley is a 84 y.o. 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  The records were reviewed and more than 20 minutes were spent reviewing his records spanning from 2017 onward including imaging studies, office visits and labs.  The patient has had previous PCI of obtuse marginal in 2020 when he was found to have evidence of occluded right coronary artery based on cardiac catheterization that was prompted by abnormal EKG done prior to surgery revealing inferior myocardial infarction.  The patient had no symptoms prior to the intervention.  He subsequently had nuclear stress test in 2021 which revealed inferoapical infarction, no ischemia, ejection fraction.  An echocardiogram in 2018 revealed normal ejection fraction with no valvular heart disease.  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  He also had thrombectomy done in [place].  He has been on anticoagulation since.  Has had no recurrences.  He is scheduled to have surgery on his right hip in the near future.  He will need cardiac clearance for that.  The patient has paroxysmal atrial fibrillation and previously has been on amiodarone therapy which she felt was causing him fatigue and he took himself off of the amiodarone but remained anticoagulated.  He expresses concern that his atrial fibrillation keep coming back and he is very symptomatic with it.  The symptoms seem to resolve spontaneously after lasting less than an hour.  There has been no thromboembolic events.  He currently denies any symptoms of angina pectoris orthopnea PND lower extremity edema and no indication of any bleeding issues.  I reviewed with the patient the most recent lab data from recent testing and from the last summer.  His creatinine is 1.7 mg/dL providing him creatinine clearance of 35 mL/min  which places him in stage IIIb chronic kidney disease.  This was explained to the patient.  I notice he is currently on Lasix 20 mg daily and I cannot find a justification for doing so.  I advised him to use it only as needed.  He never had congestive heart failure.  The patient was suspected in 2002 of having cardiac amyloidosis and went to [facility] and had biopsies which proved to be  unremarkable.  There was no evidence of amyloidosis.    Assessment/recommendations:    1-paroxysmal atrial fibrillation, currently in sinus rhythm off of the   amiodarone, currently on Eliquis.  The patient did not tolerate amiodarone for a variety of reasons and wishes not to go back on it.  I discussed with the patient at length the alternative option of radiofrequency ablation.  I pointed out to him that with his kidney disease it will not be a good idea to go on class III antiarrhythmics such as dofetilide and sotalol.  He will decide on whether he will go with ablation or not  2-coronary artery disease involving the RCA with total occlusion based on cardiac catheterization in the remote past in 2020 and angioplasty of the obtuse marginal.  Last nuclear stress test in 2021 revealed no ischemia but small inferoapical infarction with normal ejection fraction.  Currently has no symptoms of angina pectoris.  No angina equivalent.  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  He is currently anticoagulated with Eliquis.  There has been no recurrences since 2021.  4-hyperlipidemia on 20 mg daily of rosuvastatin, lipid profile on target with no side effect  5-high risk medication with anticoagulation without any bleeding complications  6-stage IIIb chronic kidney disease.  Advised patient not to take furosemide since I could not find indication and  it could lead to worsening renal function.  He agrees.  7-essential hypertension on losartan under control  8-hypothyroidism on replacement therapy with levothyroxine managed by PCP  9-severe vitamin D deficiency, recent reading provided a measurement of only 10.1 ng/mL, PCP recently placed him on vitamin D supplement  10-patient need cardiac clearance prior to hip replacement surgery.  A letter of clearance will be forwarded to the surgeon with no testing needed  11-overweight, the patient has significant physical limitations due to orthopedic problems, dietary restriction is advised  Review of Systems   Constitutional: Positive for malaise/fatigue.   Respiratory:  Positive for shortness of breath.       EKG done in office today        Vitals:    02/07/25 1347   BP: 112/60   BP Location: Right arm   Patient Position: Sitting   Pulse: 69   Weight: 95.3 kg (210 lb 3.2 oz)   Height: 1.829 m (6')        Objective   Physical Exam  Constitutional:       Appearance: Normal appearance.   HENT:      Nose: Nose normal.   Neck:      Vascular: No carotid bruit.   Cardiovascular:      Rate and Rhythm: Normal rate.      Pulses: Normal pulses.      Heart sounds: Normal heart sounds.   Pulmonary:      Effort: Pulmonary effort is normal.   Abdominal:      General: Bowel sounds are normal.      Palpations: Abdomen is soft.   Musculoskeletal:         General: Normal range of motion.      Cervical back: Normal range of motion.      Right lower leg: No edema.      Left lower leg: No edema.   Skin:     General: Skin is warm and dry.   Neurological:      General: No focal deficit present.      Mental Status: He is alert.   Psychiatric:         Mood and Affect: Mood normal.         Behavior: Behavior normal.         Thought Content: Thought content normal.         Judgment: Judgment normal.         Allergies  Patient has no known allergies.     Current Medications    Current Outpatient Medications:     Eliquis 5 mg tablet, Take 1 tablet  (5 mg) by mouth 2 times a day., Disp: , Rfl:     levothyroxine (Synthroid, Levoxyl) 150 mcg tablet, Take 1 tablet (150 mcg) by mouth once daily., Disp: , Rfl:     rosuvastatin (Crestor) 20 mg tablet, Take 1 tablet (20 mg) by mouth once daily at bedtime., Disp: , Rfl:     [START ON 2/10/2025] aspirin 81 mg EC tablet, Take 1 tablet (81 mg) by mouth 2 times a week., Disp: , Rfl:     ergocalciferol (Vitamin D-2) 1.25 MG (47608 UT) capsule, Take 1 capsule (1,250 mcg) by mouth 1 (one) time per week., Disp: , Rfl:     furosemide (Lasix) 20 mg tablet, Take 1 tablet (20 mg) by mouth if needed (for swelling)., Disp: 90 tablet, Rfl: 1    losartan (Cozaar) 25 mg tablet, Take 1 tablet (25 mg) by mouth once daily., Disp: 90 tablet, Rfl: 3                     Assessment/Plan   1. Preop cardiovascular exam        2. Paroxysmal atrial fibrillation (Multi)  Follow Up In Cardiology    ECG 12 Lead      3. Pulmonary embolism, unspecified chronicity, unspecified pulmonary embolism type, unspecified whether acute cor pulmonale present (Multi)        4. Long term current use of anticoagulant therapy        5. Coronary artery disease involving native coronary artery of native heart without angina pectoris  Follow Up In Cardiology    aspirin 81 mg EC tablet      6. History of PTCA        7. Myocardial infarction, unspecified MI type, unspecified artery (Multi)        8. Essential hypertension  furosemide (Lasix) 20 mg tablet    losartan (Cozaar) 25 mg tablet      9. Mixed hyperlipidemia        10. Stage 3b chronic kidney disease (Multi)        11. Never smoked any substance        12. BMI 28.0-28.9,adult        13. Vitamin D deficiency        14. Overweight                 Scribe Attestation  By signing my name below, I, Reanna Wagoner LPN   attest that this documentation has been prepared under the direction and in the presence of Jonathon Diallo MD.     Provider Attestation - Scribe documentation    All medical record entries made  by the Scribe were at my direction and personally dictated by me. I have reviewed the chart and agree that the record accurately reflects my personal performance of the history, physical exam, discussion and plan.

## 2025-02-07 NOTE — PATIENT INSTRUCTIONS
Please bring all medicines, vitamins, and herbal supplements with you when you come to the office.    Prescriptions will not be filled unless you are compliant with your follow up appointments or have a follow up appointment scheduled as per instruction of your physician. Refills should be requested at the time of your visit.     BMI was above normal measurement. Current weight: 95.3 kg (210 lb 3.2 oz)  Weight change since last visit (-) denotes wt loss 4.2 lbs   Weight loss needed to achieve BMI 25: 26.3 Lbs  Weight loss needed to achieve BMI 30: -10.5 Lbs  Provided instructions on dietary changes  Provided instructions on exercise.    Kwabena Farley is clear for surgery from a cardiac standpoint, may hold eliquis x2 days prior

## 2025-02-07 NOTE — LETTER
February 7, 2025     Magdaleno Scott MD  [address]  [facility] [address]    Patient: Efra Farley   YOB: 1940   Date of Visit: 2/7/2025       Dear Dr. Magdaleno Scott MD:    Thank you for referring Efra Farley to me for evaluation. Below are my notes for this consultation.  If you have questions, please do not hesitate to call me. I look forward to following your patient along with you.       Sincerely,     Jonathon Diallo MD      CC: No Recipients  ______________________________________________________________________________________    Subjective   Kwabena Farley is a 84 y.o. 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  The records were reviewed and more than 20 minutes were spent reviewing his records spanning from 2017 onward including imaging studies, office visits and labs.  The patient has had previous PCI of obtuse marginal in 2020 when he was found to have evidence of occluded right coronary artery based on cardiac catheterization that was prompted by abnormal EKG done prior to surgery revealing inferior myocardial infarction.  The patient had no symptoms prior to the intervention.  He subsequently had nuclear stress test in 2021 which revealed inferoapical infarction, no ischemia, ejection fraction.  An echocardiogram in 2018 revealed normal ejection fraction with no valvular heart disease.  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  He also had thrombectomy done in [place].  He has been on anticoagulation since.  Has had no recurrences.  He is scheduled to have surgery on his right hip in the near future.  He will need cardiac clearance for that.  The patient has paroxysmal atrial fibrillation and previously has been on amiodarone therapy which she felt was causing him fatigue and he took himself off of  the amiodarone but remained anticoagulated.  He expresses concern that his atrial fibrillation keep coming back and he is very symptomatic with it.  The symptoms seem to resolve spontaneously after lasting less than an hour.  There has been no thromboembolic events.  He currently denies any symptoms of angina pectoris orthopnea PND lower extremity edema and no indication of any bleeding issues.  I reviewed with the patient the most recent lab data from recent testing and from the last summer.  His creatinine is 1.7 mg/dL providing him creatinine clearance of 35 mL/min which places him in stage IIIb chronic kidney disease.  This was explained to the patient.  I notice he is currently on Lasix 20 mg daily and I cannot find a justification for doing so.  I advised him to use it only as needed.  He never had congestive heart failure.  The patient was suspected in 2002 of having cardiac amyloidosis and went to [facility] and had biopsies which proved to be  unremarkable.  There was no evidence of amyloidosis.    Assessment/recommendations:    1-paroxysmal atrial fibrillation, currently in sinus rhythm off of the   amiodarone, currently on Eliquis.  The patient did not tolerate amiodarone for a variety of reasons and wishes not to go back on it.  I discussed with the patient at length the alternative option of radiofrequency ablation.  I pointed out to him that with his kidney disease it will not be a good idea to go on class III antiarrhythmics such as dofetilide and sotalol.  He will decide on whether he will go with ablation or not  2-coronary artery disease involving the RCA with total occlusion based on cardiac catheterization in the remote past in 2020 and angioplasty of the obtuse marginal.  Last nuclear stress test in 2021 revealed no ischemia but small inferoapical infarction with normal ejection fraction.  Currently has no symptoms of angina pectoris.  No angina equivalent.  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  He is currently anticoagulated with Eliquis.  There has been no recurrences since 2021.  4-hyperlipidemia on 20 mg daily of rosuvastatin, lipid profile on target with no side effect  5-high risk medication with anticoagulation without any bleeding complications  6-stage IIIb chronic kidney disease.  Advised patient not to take furosemide since I could not find indication and it could lead to worsening renal function.  He agrees.  7-essential hypertension on losartan under control  8-hypothyroidism on replacement therapy with levothyroxine managed by PCP  9-severe vitamin D deficiency, recent reading provided a measurement of only 10.1 ng/mL, PCP recently placed him on vitamin D supplement  10-patient need cardiac clearance prior to hip replacement surgery.  A letter of clearance will be forwarded to the surgeon with no testing needed  11-overweight, the patient has significant physical limitations due to orthopedic problems, dietary restriction is advised  Review of Systems   Constitutional: Positive for malaise/fatigue.   Respiratory:  Positive for shortness of breath.       EKG done in office today        Vitals:    02/07/25 1347   BP: 112/60   BP Location: Right arm   Patient Position: Sitting   Pulse: 69   Weight: 95.3 kg (210 lb 3.2 oz)   Height: 1.829 m (6')        Objective   Physical Exam  Constitutional:       Appearance: Normal appearance.   HENT:      Nose: Nose normal.   Neck:      Vascular: No carotid bruit.   Cardiovascular:      Rate and Rhythm: Normal rate.      Pulses: Normal pulses.      Heart sounds: Normal heart sounds.   Pulmonary:      Effort: Pulmonary effort is normal.   Abdominal:      General: Bowel sounds are normal.      Palpations: Abdomen is soft.   Musculoskeletal:         General: Normal  range of motion.      Cervical back: Normal range of motion.      Right lower leg: No edema.      Left lower leg: No edema.   Skin:     General: Skin is warm and dry.   Neurological:      General: No focal deficit present.      Mental Status: He is alert.   Psychiatric:         Mood and Affect: Mood normal.         Behavior: Behavior normal.         Thought Content: Thought content normal.         Judgment: Judgment normal.         Allergies  Patient has no known allergies.     Current Medications    Current Outpatient Medications:   •  Eliquis 5 mg tablet, Take 1 tablet (5 mg) by mouth 2 times a day., Disp: , Rfl:   •  levothyroxine (Synthroid, Levoxyl) 150 mcg tablet, Take 1 tablet (150 mcg) by mouth once daily., Disp: , Rfl:   •  rosuvastatin (Crestor) 20 mg tablet, Take 1 tablet (20 mg) by mouth once daily at bedtime., Disp: , Rfl:   •  [START ON 2/10/2025] aspirin 81 mg EC tablet, Take 1 tablet (81 mg) by mouth 2 times a week., Disp: , Rfl:   •  ergocalciferol (Vitamin D-2) 1.25 MG (88980 UT) capsule, Take 1 capsule (1,250 mcg) by mouth 1 (one) time per week., Disp: , Rfl:   •  furosemide (Lasix) 20 mg tablet, Take 1 tablet (20 mg) by mouth if needed (for swelling)., Disp: 90 tablet, Rfl: 1  •  losartan (Cozaar) 25 mg tablet, Take 1 tablet (25 mg) by mouth once daily., Disp: 90 tablet, Rfl: 3                     Assessment/Plan   1. Preop cardiovascular exam        2. Paroxysmal atrial fibrillation (Multi)  Follow Up In Cardiology    ECG 12 Lead      3. Pulmonary embolism, unspecified chronicity, unspecified pulmonary embolism type, unspecified whether acute cor pulmonale present (Multi)        4. Long term current use of anticoagulant therapy        5. Coronary artery disease involving native coronary artery of native heart without angina pectoris  Follow Up In Cardiology    aspirin 81 mg EC tablet      6. History of PTCA        7. Myocardial infarction, unspecified MI type, unspecified artery (Multi)         8. Essential hypertension  furosemide (Lasix) 20 mg tablet    losartan (Cozaar) 25 mg tablet      9. Mixed hyperlipidemia        10. Stage 3b chronic kidney disease (Multi)        11. Never smoked any substance        12. BMI 28.0-28.9,adult        13. Vitamin D deficiency        14. Overweight                 Scribe Attestation  By signing my name below, IMalou LPN Scribe   attest that this documentation has been prepared under the direction and in the presence of Jonathon Diallo MD.     Provider Attestation - Scribe documentation    All medical record entries made by the Scribe were at my direction and personally dictated by me. I have reviewed the chart and agree that the record accurately reflects my personal performance of the history, physical exam, discussion and plan.

## 2025-02-07 NOTE — TELEPHONE ENCOUNTER
Phoned patient to discuss vitamin D level, he reports ortho followed up with him and told him to take vitamin d3 84911 weekly, Ortho will be following up with a recheck

## 2025-10-10 ENCOUNTER — APPOINTMENT (OUTPATIENT)
Dept: CARDIOLOGY | Facility: CLINIC | Age: 85
End: 2025-10-10
Payer: MEDICARE

## (undated) DEVICE — LABEL MED MINI W/ MARKER

## (undated) DEVICE — CORD,CAUTERY,BIPOLAR,STERILE: Brand: MEDLINE

## (undated) DEVICE — SYRINGE IRRIG 60ML SFT PLIABLE BLB EZ TO GRP 1 HND USE W/

## (undated) DEVICE — CHLORAPREP 26ML ORANGE

## (undated) DEVICE — BANDAGE COMPR W4INXL5YD BGE HI E W/ REM CLP SURE-WRAP

## (undated) DEVICE — BANDAGE,GAUZE,BULKEE II,4.5"X4.1YD,STRL: Brand: MEDLINE

## (undated) DEVICE — SUTURE VCRL SZ 3-0 L27IN ABSRB UD L26MM SH 1/2 CIR J416H

## (undated) DEVICE — HAND II: Brand: MEDLINE INDUSTRIES, INC.

## (undated) DEVICE — SUTURE ETHLN SZ 3-0 L18IN NONABSORBABLE BLK PS-2 L19MM 3/8 1669H

## (undated) DEVICE — GAUZE,SPONGE,FLUFF,6"X6.75",STRL,10/TRAY: Brand: MEDLINE

## (undated) DEVICE — GLOVE SURG SZ 75 L12IN FNGR THK83MIL CRM POLYISOPRENE